# Patient Record
Sex: FEMALE | Race: BLACK OR AFRICAN AMERICAN | NOT HISPANIC OR LATINO
[De-identification: names, ages, dates, MRNs, and addresses within clinical notes are randomized per-mention and may not be internally consistent; named-entity substitution may affect disease eponyms.]

---

## 2019-02-09 ENCOUNTER — TRANSCRIPTION ENCOUNTER (OUTPATIENT)
Age: 39
End: 2019-02-09

## 2020-03-23 ENCOUNTER — TRANSCRIPTION ENCOUNTER (OUTPATIENT)
Age: 40
End: 2020-03-23

## 2020-04-25 ENCOUNTER — MESSAGE (OUTPATIENT)
Age: 40
End: 2020-04-25

## 2020-05-07 LAB
SARS-COV-2 IGG SERPL IA-ACNC: <0.1 INDEX
SARS-COV-2 IGG SERPL QL IA: NEGATIVE

## 2020-05-11 ENCOUNTER — APPOINTMENT (OUTPATIENT)
Dept: DISASTER EMERGENCY | Facility: HOSPITAL | Age: 40
End: 2020-05-11

## 2020-06-03 ENCOUNTER — TRANSCRIPTION ENCOUNTER (OUTPATIENT)
Age: 40
End: 2020-06-03

## 2020-11-16 ENCOUNTER — OUTPATIENT (OUTPATIENT)
Dept: OUTPATIENT SERVICES | Facility: HOSPITAL | Age: 40
LOS: 1 days | End: 2020-11-16
Payer: COMMERCIAL

## 2020-11-16 DIAGNOSIS — B34.9 VIRAL INFECTION, UNSPECIFIED: ICD-10-CM

## 2020-11-16 LAB — SARS-COV-2 RNA SPEC QL NAA+PROBE: SIGNIFICANT CHANGE UP

## 2020-11-16 PROCEDURE — 87635 SARS-COV-2 COVID-19 AMP PRB: CPT

## 2024-03-02 ENCOUNTER — EMERGENCY (EMERGENCY)
Facility: HOSPITAL | Age: 44
LOS: 1 days | Discharge: HOME | End: 2024-03-02
Attending: EMERGENCY MEDICINE
Payer: COMMERCIAL

## 2024-03-02 VITALS — WEIGHT: 263.89 LBS | TEMPERATURE: 98 F | OXYGEN SATURATION: 98 % | HEART RATE: 91 BPM | RESPIRATION RATE: 16 BRPM

## 2024-03-02 PROCEDURE — 99284 EMERGENCY DEPT VISIT MOD MDM: CPT

## 2024-03-02 PROCEDURE — 73562 X-RAY EXAM OF KNEE 3: CPT

## 2024-03-02 PROCEDURE — 73562 X-RAY EXAM OF KNEE 3: CPT | Mod: 26,RT

## 2024-03-02 PROCEDURE — 73630 X-RAY EXAM OF FOOT: CPT

## 2024-03-02 PROCEDURE — 73630 X-RAY EXAM OF FOOT: CPT | Mod: 26,50

## 2024-03-02 PROCEDURE — 99284 EMERGENCY DEPT VISIT MOD MDM: CPT | Mod: 25

## 2024-03-02 RX ORDER — ALBUTEROL 90 UG/1
2 AEROSOL, METERED ORAL
Refills: 0 | DISCHARGE

## 2024-03-02 RX ORDER — IBUPROFEN 200 MG
600 TABLET ORAL ONCE
Refills: 0 | Status: COMPLETED | OUTPATIENT
Start: 2024-03-02 | End: 2024-03-02

## 2024-03-02 RX ADMIN — Medication 600 MILLIGRAM(S): at 09:07

## 2024-03-02 NOTE — ED PROVIDER NOTE - CARE PLAN
Assessment and plan of treatment:	This is a 43YOF with no significant PMH who presents 9 hours after a ground level fall involving R knee strike who presents with Right knee pain and tenderness over the right shin, with ability to bear weight and walk.     Plan:  -Xray Right leg  -Ibuprofen   Principal Discharge DX:	Knee sprain  Assessment and plan of treatment:	This is a 43YOF with no significant PMH who presents 9 hours after a ground level fall involving R knee strike who presents with Right knee pain and tenderness over the right shin, with ability to bear weight and walk.     Plan:  -Xray Right leg  -Ibuprofen   1

## 2024-03-02 NOTE — ED PROVIDER NOTE - WR ORDER STATUS 2
Chief Complaint   Patient presents with     RECHECK     4-6 week follow up       Initial BP 93/56  Pulse 53  Temp 98  F (36.7  C) (Oral)  Ht 1.829 m (6')  Wt 88.5 kg (195 lb)  BMI 26.45 kg/m2 Estimated body mass index is 26.45 kg/(m^2) as calculated from the following:    Height as of this encounter: 1.829 m (6').    Weight as of this encounter: 88.5 kg (195 lb).  Medication Reconciliation: complete   Performed Resulted

## 2024-03-02 NOTE — ED ADULT NURSE NOTE - OBJECTIVE STATEMENT
Patient came to the ED a/o x 3 ambulates c/o R knee pain x today. s/p slipped and fall at work, no LOC/ head strike noted. No swelling/ deformity on the R leg.

## 2024-03-02 NOTE — ED PROVIDER NOTE - NSFOLLOWUPINSTRUCTIONS_ED_ALL_ED_FT
X-rays do not reveal any fractures.  If pain persist beyond 1 week follow-up with orthopedics for possible MRI and further workup.  Take ibuprofen 600 mg every 8 hours as needed for pain.    Knee Sprain, Adult  A person's knee joint, with a close-up of the ligament.  A knee sprain is a stretch or tear in a knee ligament. Knee ligaments are tissues that connect the bones of the knee joint to each other.    What are the causes?  This condition often results from:  A fall.  An injury to the knee.  What are the signs or symptoms?  Symptoms of this condition include:  Trouble straightening or bending the leg.  Swelling in the knee.  Bruising around the knee.  Tenderness or pain in the knee.  Sudden muscle tightening (spasm) around the knee.  How is this diagnosed?  This condition may be diagnosed based on:  A physical exam.  A history of what happened just before you started to have symptoms.  Tests. These may include:  An X-ray. This may be done to make sure no bones are broken or moved out of position (dislocated).  An MRI. This may be done to check if any of the ligaments are torn and to check for other damage.  A physical exam that includes stress testing of the knee. This may be done to check for damage to ligaments.  How is this treated?  Treatment for this condition may involve:  Keeping the knee in a straightened position (immobilized) with a cast, brace, or splint.  Applying ice to the knee. This helps with pain and swelling.  Raising (elevating) the knee above the level of your heart when you are resting. This helps with pain and swelling.  Taking medicine for pain.  Doing exercises to prevent or limit long-term weakness or stiffness in your knee.  Having surgery to reconnect ligaments to the bone or to reconstruct ligaments. This may be needed if one or more ligaments are fully torn.  Follow these instructions at home:  If you have a removable splint or brace:    Wear the splint or brace as told by your health care provider. Remove it only as told by your health care provider.  Check the skin around the splint or brace every day. Tell your health care provider about any concerns.  Loosen the splint or brace if your toes tingle, become numb, or turn cold and blue.  Keep the splint or brace clean and dry.  If you have a nonremovable cast:    Do not put pressure on any part of the cast until it is fully hardened. This may take several hours.  Do not stick anything inside the cast to scratch your skin. Doing that increases your risk of infection.  Check the skin around the cast every day. Tell your health care provider about any concerns.  You may put lotion on dry skin around the edges of the cast. Do not put lotion on the skin underneath the cast.  Keep the cast clean and dry.  Bathing    If the splint, brace, or cast is not waterproof:  Do not let it get wet.  Cover it with a watertight covering when you take a bath or a shower.  Managing pain, stiffness, and swelling    A bag of ice on a towel on the skin.   If directed, put ice on the injured area. To do this:  If you have a removable splint or brace, remove it as told by your health care provider.  Put ice in a plastic bag.  Place a towel between your skin and the bag or between your cast and the bag.  Leave the ice on for 20 minutes, 2–3 times a day.  If your skin turns bright red, remove the ice right away to prevent skin damage. The risk of skin damage is higher if you cannot feel pain, heat, or cold.  Move your toes often to reduce stiffness and swelling.  Elevate the injured area above the level of your heart while you are sitting or lying down.  General instructions    Take over-the-counter and prescription medicines only as told by your health care provider.  Do not use any products that contain nicotine or tobacco. These products include cigarettes, chewing tobacco, and vaping devices, such as e-cigarettes. These can delay healing. If you need help quitting, ask your health care provider.  Do exercises as told by your health care provider.  Contact a health care provider if:  You have pain that gets worse.  The cast, brace, or splint does not fit right or gets damaged.  Get help right away if:  You cannot use your injured knee to support any of your body weight (cannot bear weight).  You cannot move the injured joint.  You cannot walk more than a few steps without pain or without your knee buckling.  You have a lot of pain, swelling, or numbness in the leg below the cast, brace, or splint.  Your foot or toes are numb, cold, or blue after you loosen your splint or brace.  This information is not intended to replace advice given to you by your health care provider. Make sure you discuss any questions you have with your health care provider.    Document Revised: 06/12/2023 Document Reviewed: 06/12/2023  [employer] Patient Education © 2024 [employer]

## 2024-03-02 NOTE — ED PROVIDER NOTE - NS ED ROS FT
REVIEW OF SYSTEMS:  CONSTITUTIONAL: No fever, chills, night sweats, or fatigue  EYES: No eye pain, visual disturbances, or discharge  ENMT:  No difficulty hearing, tinnitus, vertigo; No sinus or throat pain  NECK: No pain or stiffness  RESPIRATORY: No cough, wheezing, or hemoptysis; No shortness of breath  CARDIOVASCULAR: No chest pain, palpitations, dizziness, or leg swelling  GASTROINTESTINAL: No abdominal or epigastric pain. No nausea, vomiting, or hematemesis; No diarrhea or constipation. No melena or hematochezia.  GENITOURINARY: No dysuria, frequency, hematuria, or incontinence  NEUROLOGICAL: No headaches, memory loss, loss of strength, numbness, or tremors  SKIN: No itching, burning, rashes, or lesions   LYMPH NODES: No enlarged glands  ENDOCRINE: No heat or cold intolerance; No hair loss  MUSCULOSKELETAL: + R knee joint pain, No swelling; No muscle, back, or extremity pain  PSYCHIATRIC: No depression, anxiety, mood swings, or difficulty sleeping  HEME/LYMPH: No easy bruising, or bleeding gums  ALLERGY AND IMMUNOLOGIC: No hives or eczema

## 2024-03-02 NOTE — ED ADULT NURSE NOTE - NS ED NURSE DISCH DISPOSITION
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; ataxia; frontal lobe syndrome    INTERVAL HISTORY: waxing and waning mental status - apparently more awake last night - quite lethargic this morning but awakes to loud verbal stimuli and knows her name, her children's names and that she is in the hospital; remains in the CCU -> EKG c/w STEMI - cardiac enzymes are elevated -> catheterization not performed due to recent neurosurgery -> continues on ASA and heparin gtt without a change in brain CT; no shortness of breath or hypoxemia on a 2lpm nasal canula; no cough, sputum production, hemoptysis, chest congestion or wheeze; no fevers, chills or sweats; no chest pain/pressure or palpitations; s/p bronchoscopy with endobronchial brushings/biopsies of an obstructing left upper lobe endobronchial lesion -> 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;     REVIEW OF SYSTEMS:  Constitutional: As per interval history  HEENT: Within normal limits  CV: As per interval history  Resp: As per interval history  GI: Within normal limits   : Within normal limits  Musculoskeletal: Within normal limits  Skin: Within normal limits  Neurological: lethargy  Psychiatric: Within normal limits  Endocrine: Within normal limits  Hematologic/Lymphatic: Within normal limits  Allergic/Immunologic: Within normal limits    MEDICATIONS:     Anti-microbials:  cefTRIAXone   IVPB 1000 milliGRAM(s) IV Intermittent every 24 hours    Cardiovascular:  losartan 100 milliGRAM(s) Oral daily  metoprolol tartrate 12.5 milliGRAM(s) Oral two times a day    Other:  aspirin enteric coated 81 milliGRAM(s) Oral daily  atorvastatin 80 milliGRAM(s) Oral at bedtime  BACItracin   Ointment 1 Application(s) Topical two times a day  brivaracetam  IVPB 100 milliGRAM(s) IV Intermittent two times a day  dexAMETHasone  Injectable 2 milliGRAM(s) IV Push every 12 hours  gabapentin 200 milliGRAM(s) Oral at bedtime  heparin  Infusion 1050 Unit(s)/Hr IV Continuous <Continuous>  insulin glargine Injectable (LANTUS) 20 Unit(s) SubCutaneous at bedtime  insulin lispro (ADMELOG) corrective regimen sliding scale   SubCutaneous Before meals and at bedtime  insulin lispro Injectable (ADMELOG) 5 Unit(s) SubCutaneous before lunch  insulin lispro Injectable (ADMELOG) 7 Unit(s) SubCutaneous before dinner  insulin lispro Injectable (ADMELOG) 7 Unit(s) SubCutaneous before breakfast  pantoprazole    Tablet 40 milliGRAM(s) Oral before breakfast  polyethylene glycol 3350 17 Gram(s) Oral two times a day  senna 2 Tablet(s) Oral at bedtime    MEDICATIONS  (PRN):  bisacodyl 5 milliGRAM(s) Oral daily PRN Constipation  melatonin 5 milliGRAM(s) Oral at bedtime PRN Sleep    OBJECTIVE:  POCT Blood Glucose.: 139 mg/dL (03 Aug 2022 04:45)  POCT Blood Glucose.: 318 mg/dL (02 Aug 2022 22:27)  POCT Blood Glucose.: 255 mg/dL (02 Aug 2022 17:55)  POCT Blood Glucose.: 130 mg/dL (02 Aug 2022 13:20)    PHYSICAL EXAM:       ICU Vital Signs Last 24 Hrs  T(C): 36.7 (03 Aug 2022 06:00), Max: 36.7 (03 Aug 2022 06:00)  T(F): 98.1 (03 Aug 2022 06:00), Max: 98.1 (03 Aug 2022 06:00)  HR: 73 (03 Aug 2022 10:00) (60 - 83)  BP: 118/58 (03 Aug 2022 10:00) (77/48 - 156/97)  BP(mean): 84 (03 Aug 2022 10:00) (58 - 121)  ABP: --  ABP(mean): --  RR: 23 (03 Aug 2022 10:00) (16 - 32)  SpO2: 99% (03 Aug 2022 10:00) (93% - 99%) on 2lpm      General: Lethargic. Opens eyes to loud verbal stimuli and answers simple questions. No distress. Appears stated age.  HEENT:  Right craniotomy incision stapled. Normocephalic. Anicteric. Normal oral mucosa. PERRL. EOMI.  Neck: Supple. Trachea midline. Thyroid without enlargement/tenderness/nodules. No carotid bruit. No JVD.	  Cardiovascular: Regular rate and rhythm. S1 S2 normal. No murmurs, rubs or gallops.  Respiratory: Respirations unlabored. Clear to auscultation and percussion bilaterally. No curvature.  Abdomen: Soft. Non-tender. Non-distended. No organomegaly. No masses. Normal bowel sounds.  Extremities: Warm to touch. No clubbing or cyanosis. No pedal edema.   Pulses: 2+ peripheral pulses all extremities.	  Skin: Craniotomy scar C/D/I. Abrasion on the forehead at the site of the craniotomy dressing  Lymph Nodes: Cervical, supraclavicular and axillary nodes normal  Neurological: Moving all extremities. A and O x 2 - 3  Psychiatry: Calm    LABS:                     13.2   18.86 )-----------( 230      ( 03 Aug 2022 04:07 )             40.4   CBC    WBC  18.86 <==, 16.24 <==, 17.37 <==, 25.02 <==, 19.00 <==, 21.01 <==, 23.88 <==    Hemoglobin  13.2 <<==, 13.3 <<==, 13.4 <<==, 15.2 <<==, 12.8 <<==, 12.4 <<==, 12.2 <<==    Hematocrit  40.4 <==, 40.7 <==, 40.5 <==, 45.4 <==, 38.5 <==, 37.1 <==, 36.3 <==    Platelets  230 <==, 267 <==, 303 <==, 336 <==, 258 <==, 235 <==, 254 <==    140  |  102  |  56<H>  ----------------------------<  169<H>    08-03  4.5   |  25  |  1.28    LYTES    sodium  140 <==, 141 <==, 136 <==, 141 <==, 139 <==, 140 <==, 138 <==    potassium   4.5 <==, 4.5 <==, 4.2 <==, 4.4 <==, 4.6 <==, 4.5 <==, 4.1 <==    chloride  102 <==, 102 <==, 94 <==, 102 <==, 102 <==, 104 <==, 103 <==    carbon dioxide  25 <==, 28 <==, 25 <==, 28 <==, 26 <==, 25 <==, 18 <==  =============================================================================================  RENAL FUNCTION:    Creatinine:   1.28  <<==, 1.23  <<==, 1.24  <<==, 0.94  <<==, 0.85  <<==, 1.15  <<==, 0.93  <<==    BUN:   56 <==, 60 <==, 55 <==, 42 <==, 34 <==, 31 <==, 38 <==  ============================================================================================  calcium   8.9 <==, 8.8 <==, 9.0 <==, 9.2 <==, 9.9 <==, 9.1 <==, 9.1 <==    phos   2.7 <==, 3.3 <==, 3.4 <==, 4.2 <==, 4.2 <==    mag   2.2 <==, 1.7 <==, 1.6 <==, 1.9 <==, 1.8 <==  ============================================================================================  LFTs    AST:   57 <== , 53 <== , 25 <== , 14 <==     ALT:  16  <== , 14  <== , 16  <== , 21  <==     AP:  66  <=, 63  <=, 75  <=, 76  <=    Bili:  0.2  <=, 0.2  <=, 0.2  <=, 0.2  <=    PT/INR - ( 03 Aug 2022 04:07 )   PT: 11.8 sec;   INR: 1.02 ratio       PTT - ( 03 Aug 2022 04:07 )  PTT:93.5 sec    ABG - ( 03 Aug 2022 08:23 )  pH: 7.56  /  pCO2: 33    /  pO2: 84    / HCO3: 30    / Base Excess: 7.3   /  SaO2: 97.0      Procalcitonin, Serum: 0.11 ng/mL ( @ 07:45)    Serum Pro-Brain Natriuretic Peptide: 7944 pg/mL ( @ 07:45)  Serum Pro-Brain Natriuretic Peptide: 7011 pg/mL ( @ 04:07)  Serum Pro-Brain Natriuretic Peptide: 1286 pg/mL ( @ 20:29)    CARDIAC MARKERS ( 03 Aug 2022 08:57 )   U/L /CKMB x     /CKMB Units 29.3 ng/mL    troponin 1121 ng/L    CARDIAC MARKERS ( 03 Aug 2022 07:45 )   U/L /CKMB x     /CKMB Units 32.0 ng/mL    troponin 1120 ng/L    CARDIAC MARKERS ( 02 Aug 2022 17:12 )   U/L /CKMB x     /CKMB Units 63.2 ng/mL    troponin 948 ng/L    CARDIAC MARKERS ( 02 Aug 2022 11:09 )   U/L /CKMB x     /CKMB Units 72.7 ng/mL    troponin 961 ng/L    CARDIAC MARKERS ( 02 Aug 2022 04:07 )   U/L /CKMB x     /CKMB Units 57.1 ng/mL    troponin 622 ng/L    CARDIAC MARKERS ( 01 Aug 2022 20:29 )   U/L /CKMB x     /CKMB Units 19.7 ng/mL    troponin 285 ng/L    CARDIAC MARKERS ( 01 Aug 2022 15:26 )  CPK 78 U/L /CKMB x     /CKMB Units 2.0 ng/mL    troponin 22 ng/L    < from: Limited Transthoracic Echo (w/Cont) (22 @ 08:02) >    Patient name: LANIE BERTRAND  YOB: 1941   Age: 81 (F)   MR#: [id number]  Study Date: 2022  ------------------------------------------------------------------------  Conclusions:  1. Endocardial visualization enhanced with intravenous  injection of Ultrasonic Enhancing Agent (Lumason). Left  ventricle not well visualized despite intravenous  ultrasonic enhancing agent; grossly, preserved left  ventricular systolic function with segmental wall motion  abnormalities. EF approximately 55%. The mid to distal  inferolateral and mid to distal inferior segments are  hypkinetic. The apex is akinetic. No left ventricular  thrombus seen.  *** Compared with echocardiogram of 2022, results are  similar on today's study. The mid to distal inferior and  the apex appear hypo/akinetic on today's study on images  with intravenous ultrasonic enhancing agent.  ------------------------------------------------------------------------  Confirmed on  2022 - 10:57:19 by Kelsie Becker M.D.  ------------------------------------------------------------------------  ------------------------------------------------------------------------  Surgical Pathology Report (22 @ 11:53)   Surgical Pathology Report:   1. Left mainstem bronchus biopsy   Final Diagnosis   Bronchus, mainstem, left, biopsy:   In summary the morphology and immunophenotype are consistent with poorly   differentiated adenocarcinoma of lung origin.   Dr. Hopkins was notified of the diagnosis on 2022.   ------------------------------------------------------------------------------  MICROBIOLOGY:   Urinalysis Basic - ( 03 Aug 2022 08:40 )    Color: Light Yellow / Appearance: Clear / S.015 / pH: x  Gluc: x / Ketone: Trace  / Bili: Negative / Urobili: Negative   Blood: x / Protein: Negative / Nitrite: Negative   Leuk Esterase: Large / RBC: 1 /hpf / WBC 51 /HPF   Sq Epi: x / Non Sq Epi: 0 /hpf / Bacteria: Many    RADIOLOGY:  EXAM:  CT BRAIN                          PROCEDURE DATE:  2022      IMPRESSION:    Redemonstration of right temporal craniotomy for prior resection of a   lesion. There is a large degree of hypoattenuation throughout the right   temporal lobe and extending into the right basal ganglia and right   parietal lobe, suggestive of a combination of vasogenic edema and/or   encephalomalacia/gliosis. This results in secondary mass effect upon the   parenchyma of the right cerebral hemisphere, not significantly changed.   There is mild mass effect on the ventricular system, right greater than   left. There is secondary right to left midline shift of approximately 5.7   mm. Expected postsurgical extra-axial pneumocephalus is reidentified   without significant change. Tiny expected subdural hematoma overlying the   right anterior superior frontal lobe is reidentified without change.    Reidentified is a hypoattenuating lesion within the left medial frontal   lobe measuring 1.4 x 1.6 cm, compatible with known additional metastasis.   No significant surrounding edema.    NERI CERNA MD; Resident Radiologist  This document has been electronically signed.  ALTAF SEBASTIAN MD; Attending Radiologist  This document has been electronically signed. Aug  3 2022 10:23AM  ---------------------------------------------------------------------------------------------------------------  EXAM:  CT ABDOMEN AND PELVIS IC                        EXAM:  CT CHEST IC                          PROCEDURE DATE:  2022      FINDINGS:  CHEST:  LUNGS AND LARGE AIRWAYS: There is a heterogeneous left upper lobe mass   approximately measuring 3.3 x 2.6 cm (2, 28) obstructing the left apical   posterior bronchus with partial atelectasis of the left upper lobe. Few   scattered bilateral pulmonary nodules measuring up to 3 mm in the right   upper lobe (2, 39).  PLEURA: No pleural effusion.  VESSELS: Atherosclerotic changes of the aorta and coronary arteries.  HEART: Heart size is normal. No pericardial effusion.  MEDIASTINUM AND SIXTO: Soft tissue contiguous with mass versus adenopathy   measuring 1.7 x 1.5 cm (2, 33).  CHEST WALL AND LOWER NECK: Bilateral subcentimeter hypoattenuating   thyroid nodules.    ABDOMEN AND PELVIS:  LIVER: Within normal limits.  BILE DUCTS: Normal caliber.  GALLBLADDER: Within normal limits.  SPLEEN: Within normal limits.  PANCREAS: Within normal limits.  ADRENALS: Indeterminant left adrenal nodule measuring 1.2 cm.  KIDNEYS/URETERS: No hydronephrosis. Renal cysts and subcentimeter   hypoattenuating foci bilaterally, too small to characterize.    BLADDER: Within normal limits.  REPRODUCTIVE ORGANS: Uterus and adnexa within normal limits.    BOWEL: Colonic diverticula withoutevidence of acute diverticulitis. No   bowel obstruction. Appendix is normal.  PERITONEUM: No ascites.  VESSELS: Atherosclerotic changes.  RETROPERITONEUM/LYMPH NODES: No lymphadenopathy.  ABDOMINAL WALL: Within normal limits.  BONES: Degenerative changes. Grade 1 anterolisthesis of L5 on S1. Status   post right shoulder arthroplasty.    IMPRESSION:  Left upper lobe lung mass with partial atelectasis of the left upper   lobe, concerning for primary lung neoplasm. Few scattered sub-4 mm   pulmonary nodules, indeterminate.    Indeterminant left adrenal nodule for which contrast enhanced MRI is   recommended for further evaluation.    DEEPAK MORALES MD; Resident Radiologist  This document has been electronically signed.  HAYLEY DENTON MD; Attending Radiologist  This document has been electronically signed. 2022  9:02AM  ---------------------------------------------------------------------------------------------------------------  EXAM:  DUPLEX SCAN EXT VEINS LOWER BI                          PROCEDURE DATE:  2022      IMPRESSION:  No evidence of deep venous thrombosis in either lower extremity.    YENIFER QUAN MD; Attending Radiologist  This document has been electronically signed. 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; pulmonary embolism; STEMI; lethargy    INTERVAL HISTORY: waxing and waning mental status - apparently more awake last night - quite lethargic this morning but awakes to loud verbal stimuli and knows her name, her children's names and that she is in the hospital; remains in the CCU -> EKG c/w STEMI - cardiac enzymes are elevated -> catheterization not performed due to recent neurosurgery -> continues on ASA and heparin gtt without a change in brain CT; no shortness of breath or hypoxemia on a 2lpm nasal canula; no cough, sputum production, hemoptysis, chest congestion or wheeze; no fevers, chills or sweats; no chest pain/pressure or palpitations; s/p bronchoscopy with endobronchial brushings/biopsies of an obstructing left upper lobe endobronchial lesion -> 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;     REVIEW OF SYSTEMS:  Constitutional: As per interval history  HEENT: Within normal limits  CV: As per interval history  Resp: As per interval history  GI: Within normal limits   : Within normal limits  Musculoskeletal: Within normal limits  Skin: Within normal limits  Neurological: lethargy  Psychiatric: Within normal limits  Endocrine: Within normal limits  Hematologic/Lymphatic: Within normal limits  Allergic/Immunologic: Within normal limits    MEDICATIONS:     Anti-microbials:  cefTRIAXone   IVPB 1000 milliGRAM(s) IV Intermittent every 24 hours    Cardiovascular:  losartan 100 milliGRAM(s) Oral daily  metoprolol tartrate 12.5 milliGRAM(s) Oral two times a day    Other:  aspirin enteric coated 81 milliGRAM(s) Oral daily  atorvastatin 80 milliGRAM(s) Oral at bedtime  BACItracin   Ointment 1 Application(s) Topical two times a day  brivaracetam  IVPB 100 milliGRAM(s) IV Intermittent two times a day  dexAMETHasone  Injectable 2 milliGRAM(s) IV Push every 12 hours  gabapentin 200 milliGRAM(s) Oral at bedtime  heparin  Infusion 1050 Unit(s)/Hr IV Continuous <Continuous>  insulin glargine Injectable (LANTUS) 20 Unit(s) SubCutaneous at bedtime  insulin lispro (ADMELOG) corrective regimen sliding scale   SubCutaneous Before meals and at bedtime  insulin lispro Injectable (ADMELOG) 5 Unit(s) SubCutaneous before lunch  insulin lispro Injectable (ADMELOG) 7 Unit(s) SubCutaneous before dinner  insulin lispro Injectable (ADMELOG) 7 Unit(s) SubCutaneous before breakfast  pantoprazole    Tablet 40 milliGRAM(s) Oral before breakfast  polyethylene glycol 3350 17 Gram(s) Oral two times a day  senna 2 Tablet(s) Oral at bedtime    MEDICATIONS  (PRN):  bisacodyl 5 milliGRAM(s) Oral daily PRN Constipation  melatonin 5 milliGRAM(s) Oral at bedtime PRN Sleep    OBJECTIVE:  POCT Blood Glucose.: 139 mg/dL (03 Aug 2022 04:45)  POCT Blood Glucose.: 318 mg/dL (02 Aug 2022 22:27)  POCT Blood Glucose.: 255 mg/dL (02 Aug 2022 17:55)  POCT Blood Glucose.: 130 mg/dL (02 Aug 2022 13:20)    PHYSICAL EXAM:       ICU Vital Signs Last 24 Hrs  T(C): 36.7 (03 Aug 2022 06:00), Max: 36.7 (03 Aug 2022 06:00)  T(F): 98.1 (03 Aug 2022 06:00), Max: 98.1 (03 Aug 2022 06:00)  HR: 73 (03 Aug 2022 10:00) (60 - 83)  BP: 118/58 (03 Aug 2022 10:00) (77/48 - 156/97)  BP(mean): 84 (03 Aug 2022 10:00) (58 - 121)  ABP: --  ABP(mean): --  RR: 23 (03 Aug 2022 10:00) (16 - 32)  SpO2: 99% (03 Aug 2022 10:00) (93% - 99%) on 2lpm      General: Lethargic. Opens eyes to loud verbal stimuli and answers simple questions. No distress. Appears stated age.  HEENT:  Right craniotomy incision stapled. Normocephalic. Anicteric. Normal oral mucosa. PERRL. EOMI.  Neck: Supple. Trachea midline. Thyroid without enlargement/tenderness/nodules. No carotid bruit. No JVD.	  Cardiovascular: Regular rate and rhythm. S1 S2 normal. No murmurs, rubs or gallops.  Respiratory: Respirations unlabored. Clear to auscultation and percussion bilaterally. No curvature.  Abdomen: Soft. Non-tender. Non-distended. No organomegaly. No masses. Normal bowel sounds.  Extremities: Warm to touch. No clubbing or cyanosis. No pedal edema.   Pulses: 2+ peripheral pulses all extremities.	  Skin: Craniotomy scar C/D/I. Abrasion on the forehead at the site of the craniotomy dressing  Lymph Nodes: Cervical, supraclavicular and axillary nodes normal  Neurological: Moving all extremities. A and O x 2 - 3  Psychiatry: Calm    LABS:                     13.2   18.86 )-----------( 230      ( 03 Aug 2022 04:07 )             40.4   CBC    WBC  18.86 <==, 16.24 <==, 17.37 <==, 25.02 <==, 19.00 <==, 21.01 <==, 23.88 <==    Hemoglobin  13.2 <<==, 13.3 <<==, 13.4 <<==, 15.2 <<==, 12.8 <<==, 12.4 <<==, 12.2 <<==    Hematocrit  40.4 <==, 40.7 <==, 40.5 <==, 45.4 <==, 38.5 <==, 37.1 <==, 36.3 <==    Platelets  230 <==, 267 <==, 303 <==, 336 <==, 258 <==, 235 <==, 254 <==    140  |  102  |  56<H>  ----------------------------<  169<H>    08-03  4.5   |  25  |  1.28    LYTES    sodium  140 <==, 141 <==, 136 <==, 141 <==, 139 <==, 140 <==, 138 <==    potassium   4.5 <==, 4.5 <==, 4.2 <==, 4.4 <==, 4.6 <==, 4.5 <==, 4.1 <==    chloride  102 <==, 102 <==, 94 <==, 102 <==, 102 <==, 104 <==, 103 <==    carbon dioxide  25 <==, 28 <==, 25 <==, 28 <==, 26 <==, 25 <==, 18 <==  =============================================================================================  RENAL FUNCTION:    Creatinine:   1.28  <<==, 1.23  <<==, 1.24  <<==, 0.94  <<==, 0.85  <<==, 1.15  <<==, 0.93  <<==    BUN:   56 <==, 60 <==, 55 <==, 42 <==, 34 <==, 31 <==, 38 <==  ============================================================================================  calcium   8.9 <==, 8.8 <==, 9.0 <==, 9.2 <==, 9.9 <==, 9.1 <==, 9.1 <==    phos   2.7 <==, 3.3 <==, 3.4 <==, 4.2 <==, 4.2 <==    mag   2.2 <==, 1.7 <==, 1.6 <==, 1.9 <==, 1.8 <==  ============================================================================================  LFTs    AST:   57 <== , 53 <== , 25 <== , 14 <==     ALT:  16  <== , 14  <== , 16  <== , 21  <==     AP:  66  <=, 63  <=, 75  <=, 76  <=    Bili:  0.2  <=, 0.2  <=, 0.2  <=, 0.2  <=    PT/INR - ( 03 Aug 2022 04:07 )   PT: 11.8 sec;   INR: 1.02 ratio       PTT - ( 03 Aug 2022 04:07 )  PTT:93.5 sec    ABG - ( 03 Aug 2022 08:23 )  pH: 7.56  /  pCO2: 33    /  pO2: 84    / HCO3: 30    / Base Excess: 7.3   /  SaO2: 97.0      Procalcitonin, Serum: 0.11 ng/mL ( @ 07:45)    Serum Pro-Brain Natriuretic Peptide: 7944 pg/mL ( @ 07:45)  Serum Pro-Brain Natriuretic Peptide: 7011 pg/mL ( @ 04:07)  Serum Pro-Brain Natriuretic Peptide: 1286 pg/mL ( @ 20:29)    CARDIAC MARKERS ( 03 Aug 2022 08:57 )   U/L /CKMB x     /CKMB Units 29.3 ng/mL    troponin 1121 ng/L    CARDIAC MARKERS ( 03 Aug 2022 07:45 )   U/L /CKMB x     /CKMB Units 32.0 ng/mL    troponin 1120 ng/L    CARDIAC MARKERS ( 02 Aug 2022 17:12 )   U/L /CKMB x     /CKMB Units 63.2 ng/mL    troponin 948 ng/L    CARDIAC MARKERS ( 02 Aug 2022 11:09 )   U/L /CKMB x     /CKMB Units 72.7 ng/mL    troponin 961 ng/L    CARDIAC MARKERS ( 02 Aug 2022 04:07 )   U/L /CKMB x     /CKMB Units 57.1 ng/mL    troponin 622 ng/L    CARDIAC MARKERS ( 01 Aug 2022 20:29 )   U/L /CKMB x     /CKMB Units 19.7 ng/mL    troponin 285 ng/L    CARDIAC MARKERS ( 01 Aug 2022 15:26 )  CPK 78 U/L /CKMB x     /CKMB Units 2.0 ng/mL    troponin 22 ng/L    < from: Limited Transthoracic Echo (w/Cont) (22 @ 08:02) >    Patient name: LANIE BERTRAND  YOB: 1941   Age: 81 (F)   MR#: [id number]  Study Date: 2022  ------------------------------------------------------------------------  Conclusions:  1. Endocardial visualization enhanced with intravenous  injection of Ultrasonic Enhancing Agent (Lumason). Left  ventricle not well visualized despite intravenous  ultrasonic enhancing agent; grossly, preserved left  ventricular systolic function with segmental wall motion  abnormalities. EF approximately 55%. The mid to distal  inferolateral and mid to distal inferior segments are  hypkinetic. The apex is akinetic. No left ventricular  thrombus seen.  *** Compared with echocardiogram of 2022, results are  similar on today's study. The mid to distal inferior and  the apex appear hypo/akinetic on today's study on images  with intravenous ultrasonic enhancing agent.  ------------------------------------------------------------------------  Confirmed on  2022 - 10:57:19 by Kelsie Becker M.D.  ------------------------------------------------------------------------  ------------------------------------------------------------------------  Surgical Pathology Report (22 @ 11:53)   Surgical Pathology Report:   1. Left mainstem bronchus biopsy   Final Diagnosis   Bronchus, mainstem, left, biopsy:   In summary the morphology and immunophenotype are consistent with poorly   differentiated adenocarcinoma of lung origin.   Dr. Hopkins was notified of the diagnosis on 2022.   ------------------------------------------------------------------------------  MICROBIOLOGY:   Urinalysis Basic - ( 03 Aug 2022 08:40 )    Color: Light Yellow / Appearance: Clear / S.015 / pH: x  Gluc: x / Ketone: Trace  / Bili: Negative / Urobili: Negative   Blood: x / Protein: Negative / Nitrite: Negative   Leuk Esterase: Large / RBC: 1 /hpf / WBC 51 /HPF   Sq Epi: x / Non Sq Epi: 0 /hpf / Bacteria: Many    RADIOLOGY:    [facility]                          PROCEDURE DATE:  2022      FINDINGS:    PULMONARY ANGIOGRAM: Pulmonary embolism in the lateral segmental branch   of the right middle lobe without evidence of right heart strain. Multiple   segmental branches in the left lung are obscured by motion.    LYMPH NODES: Stable 1.2 cm left hilar lymph node.    HEART/VASCULATURE: The heart is normal in size. No pericardial effusion.   There are aortic, aortic valve, and coronary artery calcifications.    AIRWAYS/LUNGS/PLEURA: Patent central airways. Left upper lobe mass   measuring 2.7 x 2.1 cm, series 5 image 50, decreased in size from prior,   and unchanged obstruction left apical posterior bronchus, and associated   segmental atelectasis along the mediastinum. No pleural effusion or   pneumothorax.    UPPER ABDOMEN: Within normal limits.    BONES/SOFT TISSUES: Stable multinodular thyroid. Metallic hardware in the   right humeral head and shaft. Degenerative changes of the spine.    IMPRESSION:  Pulmonary embolism in the lateral segmental branch of the right middle   lobe. No evidence of right heart strain.    Interval decrease in left upper lobe mass. Unchanged obstruction of the   posterior bronchus, and stable fibroglandular lymph node.    COMMUNICATION:  Discussed positive PE findings with [facility] DAVID Poon by Dr. Michelle Rebollar at 12:15 PM on 8/3/2022.    MICHELLE JAUREGUI MD; Resident Radiologist  This document has been electronically signed.  NEHA BAILON M.D., ATTENDINGRADIOGIST  This document has been electronically signed. Aug  3 2022 12:35PM  ---------------------------------------------------------------------------------------------------------------  EXAM:  CT BRAIN                          PROCEDURE DATE:  2022      IMPRESSION:    Redemonstration of right temporal craniotomy for prior resection of a   lesion. There is a large degree of hypoattenuation throughout the right   temporal lobe and extending into the right basal ganglia and right   parietal lobe, suggestive of a combination of vasogenic edema and/or   encephalomalacia/gliosis. This results in secondary mass effect upon the   parenchyma of the right cerebral hemisphere, not significantly changed.   There is mild mass effect on the ventricular system, right greater than   left. There is secondary right to left midline shift of approximately 5.7   mm. Expected postsurgical extra-axial pneumocephalus is reidentified   without significant change. Tiny expected subdural hematoma overlying the   right anterior superior frontal lobe is reidentified without change.    Reidentified is a hypoattenuating lesion within the left medial frontal   lobe measuring 1.4 x 1.6 cm, compatible with known additional metastasis.   No significant surrounding edema.    NERI CERNA MD; Resident Radiologist  This document has been electronically signed.  ALTAF SEBASTIAN MD; Attending Radiologist  This document has been electronically signed. Aug  3 2022 10:23AM  ---------------------------------------------------------------------------------------------------------------  EXAM:  CT ABDOMEN AND PELVIS IC                        EXAM:  CT CHEST IC                          PROCEDURE DATE:  2022      FINDINGS:  CHEST:  LUNGS AND LARGE AIRWAYS: There is a heterogeneous left upper lobe mass   approximately measuring 3.3 x 2.6 cm (2, 28) obstructing the left apical   posterior bronchus with partial atelectasis of the left upper lobe. Few   scattered bilateral pulmonary nodules measuring up to 3 mm in the right   upper lobe (2, 39).  PLEURA: No pleural effusion.  VESSELS: Atherosclerotic changes of the aorta and coronary arteries.  HEART: Heart size is normal. No pericardial effusion.  MEDIASTINUM AND SIXTO: Soft tissue contiguous with mass versus adenopathy   measuring 1.7 x 1.5 cm (2, 33).  CHEST WALL AND LOWER NECK: Bilateral subcentimeter hypoattenuating   thyroid nodules.    ABDOMEN AND PELVIS:  LIVER: Within normal limits.  BILE DUCTS: Normal caliber.  GALLBLADDER: Within normal limits.  SPLEEN: Within normal limits.  PANCREAS: Within normal limits.  ADRENALS: Indeterminant left adrenal nodule measuring 1.2 cm.  KIDNEYS/URETERS: No hydronephrosis. Renal cysts and subcentimeter   hypoattenuating foci bilaterally, too small to characterize.    BLADDER: Within normal limits.  REPRODUCTIVE ORGANS: Uterus and adnexa within normal limits.    BOWEL: Colonic diverticula withoutevidence of acute diverticulitis. No   bowel obstruction. Appendix is normal.  PERITONEUM: No ascites.  VESSELS: Atherosclerotic changes.  RETROPERITONEUM/LYMPH NODES: No lymphadenopathy.  ABDOMINAL WALL: Within normal limits.  BONES: Degenerative changes. Grade 1 anterolisthesis of L5 on S1. Status   post right shoulder arthroplasty.    IMPRESSION:  Left upper lobe lung mass with partial atelectasis of the left upper   lobe, concerning for primary lung neoplasm. Few scattered sub-4 mm   pulmonary nodules, indeterminate.    Indeterminant left adrenal nodule for which contrast enhanced MRI is   recommended for further evaluation.    DEEPAK MORALES MD; Resident Radiologist  This document has been electronically signed.  HAYLEY DENTON MD; Attending Radiologist  This document has been electronically signed. 2022  9:02AM  ---------------------------------------------------------------------------------------------------------------  EXAM:  DUPLEX SCAN EXT VEINS LOWER BI                          PROCEDURE DATE:  2022      IMPRESSION:  No evidence of deep venous thrombosis in either lower extremity.    YENIFER QUAN MD; Attending Radiologist  This document has been electronically signed. 2022  2:08PM  ---------------------------------------------------------------------------------------------------------------                 Discharged

## 2024-03-02 NOTE — ED ADULT NURSE NOTE - NSFALLRISKINTERV_ED_ALL_ED

## 2024-03-02 NOTE — ED PROVIDER NOTE - CLINICAL SUMMARY MEDICAL DECISION MAKING FREE TEXT BOX
Patient presents after fall at work last night landing on the right knee.  Currently complaining of right knee pain and bilateral foot pain.  Did not hit her head.  On exam tender to right anterior medial knee tender to the dorsal aspect of both feet.  No visible bruising swelling or deformity.  2+ Dorsalis pedis pulse.  Awake alert not in any distress.  X-ray unremarkable no visible fracture.  Ibuprofen provided for pain.  Home with PCP follow-up.  Patient advised to follow-up with Ortho if pain persist beyond 1 week.  Patient ambulatory steady gait.

## 2024-03-02 NOTE — ED PROVIDER NOTE - PHYSICAL EXAMINATION
T(C): 36.8 (03-02-24 @ 08:05), Max: 36.8 (03-02-24 @ 08:05)  HR: 91 (03-02-24 @ 08:05) (91 - 91)  BP: --  RR: 16 (03-02-24 @ 08:05) (16 - 16)  SpO2: 98% (03-02-24 @ 08:05) (98% - 98%)    GENERAL: patient appears well, no acute distress, appropriate, pleasant  EYES: sclera clear, no exudates  ENMT: oropharynx clear without erythema, no exudates, moist mucous membranes  NECK: supple, soft, no thyromegaly noted  LUNGS: good air entry bilaterally, clear to auscultation, symmetric breath sounds, no wheezing or rhonchi appreciated  HEART: soft S1/S2, regular rate and rhythm, no murmurs noted, no lower extremity edema  GASTROINTESTINAL: abdomen is soft, nontender, nondistended, normoactive bowel sounds, no palpable masses  INTEGUMENT: good skin turgor, no lesions noted  MUSCULOSKELETAL: no clubbing or cyanosis, no obvious deformity  NEUROLOGIC: awake, alert, oriented x3, good muscle tone in 4 extremities, no obvious sensory deficits  PSYCHIATRIC: mood is good, affect is congruent, linear and logical thought process  HEME/LYMPH: no palpable supraclavicular nodules, no obvious ecchymosis or petechiae

## 2024-03-02 NOTE — ED PROVIDER NOTE - PLAN OF CARE
This is a 43YOF with no significant PMH who presents 9 hours after a ground level fall involving R knee strike who presents with Right knee pain and tenderness over the right shin, with ability to bear weight and walk.     Plan:  -Xray Right leg  -Ibuprofen

## 2024-03-02 NOTE — ED PROVIDER NOTE - PATIENT PORTAL LINK FT
You can access the FollowMyHealth Patient Portal offered by [facility] by registering at the following website: [email or web address]. By joining [facility]’s FollowMyHealth portal, you will also be able to view your health information using other applications (apps) compatible with our system.

## 2024-03-02 NOTE — ED PROVIDER NOTE - OBJECTIVE STATEMENT
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 The fall was ground level and the patient struck her right knee and attempted to break the fall with her right hand. She denied any headstrike or loss of consciousness during the fall. She states that immediately after the fall she had 9/10 pain in her right knee and shin for which she took Ibuprofen at around 00:30 am and used an ice pack to manage the pain. She has not taken any pain medication since and stayed to complete her shift. She is able to walk and bear weight on the right leg, but has been limping as this causes her a great deal of pain. She denies any other symptoms including CP, SOB, F/C.

## 2024-03-02 NOTE — ED PROVIDER NOTE - NS ED ATTENDING STATEMENT MOD
I have seen and examined this patient and fully participated in the care of this patient as the teaching attending.  The service was shared with the JONO.  I reviewed and verified the documentation. Attending with